# Patient Record
(demographics unavailable — no encounter records)

---

## 2024-10-08 NOTE — PROCEDURE
[___ mm] : [unfilled] mm [___ cm] : [unfilled] cm [___ ml of 1% lidocaine w/ 1:100,000 epinephrine] : [unfilled] ml of 1% lidocaine with 1:100,000 epinephrine [Pressure] : pressure [Electrodessication] : electrodessication [____ x ____ cm] : [unfilled] cm [4-0] : 4-0 Prolene [____ cm] : [unfilled] cm [____ days] : [unfilled] days [FreeTextEntry7] : left periumbilical [TWNoteComboBox1] : Dysplastic Nevus [TWNoteComboBox4] : Dysplastic Nevus [TWNoteComboBox3] : Subcutaneous fat [TWNoteComboBox5] : Subcutaneous fat

## 2024-10-17 NOTE — HISTORY OF PRESENT ILLNESS
[de-identified] : Patient presents for suture removal-  wound healed well, no signs infection sutures removed, dressed; Path:  Margins negative  Continue regular exams;

## 2024-12-30 NOTE — HEALTH RISK ASSESSMENT
[Good] : ~his/her~  mood as  good [Yes] : Yes [2 - 4 times a month (2 pts)] : 2-4 times a month (2 points) [1 or 2 (0 pts)] : 1 or 2 (0 points) [Never (0 pts)] : Never (0 points) [Little interest or pleasure doing things] : 1) Little interest or pleasure doing things [Feeling down, depressed, or hopeless] : 2) Feeling down, depressed, or hopeless [0] : 2) Feeling down, depressed, or hopeless: Not at all (0) [PHQ-2 Negative - No further assessment needed] : PHQ-2 Negative - No further assessment needed [With Significant Other] : lives with significant other [Employed] : employed [College] : College [Significant Other] : lives with significant other [Sexually Active] : sexually active [Never] : Never [0-4] : 0-4 [Audit-CScore] : 2 [[id number]] : 0 [High Risk Behavior] : no high risk behavior [PapSmearComments] : Never performed [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

## 2024-12-30 NOTE — REVIEW OF SYSTEMS
[Muscle Pain] : muscle pain [Anxiety] : anxiety [Fever] : no fever [Chills] : no chills [Pain] : no pain [Vision Problems] : no vision problems [Hearing Loss] : no hearing loss [Sore Throat] : no sore throat [Chest Pain] : no chest pain [Palpitations] : no palpitations [Shortness Of Breath] : no shortness of breath [Cough] : no cough [Abdominal Pain] : no abdominal pain [Nausea] : no nausea [Diarrhea] : diarrhea [Vomiting] : no vomiting [Dysuria] : no dysuria [Itching] : no itching [Skin Rash] : no skin rash [Headache] : no headache [Dizziness] : no dizziness [Negative] : Heme/Lymph [Muscle Weakness] : no muscle weakness [Back Pain] : no back pain [Depression] : no depression

## 2024-12-30 NOTE — HEALTH RISK ASSESSMENT
[Good] : ~his/her~  mood as  good [Yes] : Yes [2 - 4 times a month (2 pts)] : 2-4 times a month (2 points) [1 or 2 (0 pts)] : 1 or 2 (0 points) [Never (0 pts)] : Never (0 points) [Little interest or pleasure doing things] : 1) Little interest or pleasure doing things [Feeling down, depressed, or hopeless] : 2) Feeling down, depressed, or hopeless [0] : 2) Feeling down, depressed, or hopeless: Not at all (0) [PHQ-2 Negative - No further assessment needed] : PHQ-2 Negative - No further assessment needed [With Significant Other] : lives with significant other [Employed] : employed [College] : College [Significant Other] : lives with significant other [Sexually Active] : sexually active [Never] : Never [0-4] : 0-4 [Audit-CScore] : 2 [[id number]] : 0 [High Risk Behavior] : no high risk behavior [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

## 2024-12-30 NOTE — HISTORY OF PRESENT ILLNESS
[FreeTextEntry1] : Establish care, annual physical  [de-identified] : Pt is a 27yo F PMHx thyroid nodules, hashimoto thyroiditis, atypical melanocytic nevus (s/p excision), hx depression/anxiety presenting to establish care. Follows with endocrine for thyroiditis and thyroid nodule. Thyroid nodule was found incidentally on MRI C-spine that was ordered by ortho as pt was having abnormal sensation radiating from neck to top of head causing muscle spasms. She was seen by head and neck surgeon, Dr. Saini, who does not recommend removal at this time. She also follows with endocrinology. Pt is currently euthyroid, had labs in October, not on thyroid meds. She is taking weekly Ergocalciferol. Follows with cardiology (Dr. Roland) for palpitations. Pt had Holter monitor eval, showed sinus tachycardia, normal TTE and normal exercise stress test. Endocrinologist believes her tachycardia was secondary from temporary thyrotoxicosis. Pt also with non-specific myalgias in the R radius as well as the R thigh. Believes all of her symptoms could be have a temporal relation to her menstrual cycle. Pt also concerned about abnormal labs she had with a holistic doctor including MINOR (1:80) and MTHFR gene. Pt also states she is concerned for scoliosis given a protrusion in her ribs, denies any spinal tenderness or nerve pain.

## 2024-12-30 NOTE — HISTORY OF PRESENT ILLNESS
[FreeTextEntry1] : Establish care, annual physical  [de-identified] : Pt is a 27yo F PMHx thyroid nodules, hashimoto thyroiditis, atypical melanocytic nevus (s/p excision), hx depression/anxiety presenting to establish care. Follows with endocrine for thyroiditis and thyroid nodule. Thyroid nodule was found incidentally on MRI C-spine that was ordered by ortho as pt was having abnormal sensation radiating from neck to top of head causing muscle spasms. She was seen by head and neck surgeon, Dr. Saini, who does not recommend removal at this time. She also follows with endocrinology. Pt is currently euthyroid, had labs in October, not on thyroid meds. She is taking weekly Ergocalciferol. Follows with cardiology (Dr. Roland) for palpitations. Pt had Holter monitor eval, showed sinus tachycardia, normal TTE and normal exercise stress test. Endocrinologist believes her tachycardia was secondary from temporary thyrotoxicosis. Pt also with non-specific myalgias in the R radius as well as the R thigh. Believes all of her symptoms could be have a temporal relation to her menstrual cycle. Pt also concerned about abnormal labs she had with a holistic doctor including MINOR (1:80) and MTHFR gene. Pt also states she is concerned for scoliosis given a protrusion in her ribs, denies any spinal tenderness or nerve pain.

## 2024-12-30 NOTE — PLAN
[FreeTextEntry1] :  1. HCM - check blood work, f/u results - referral to GYN - declines Flu vaccine  2. Myalgias, cervical (Neck pain)  - Pt with myalgias and abnormal MSK sensation in C-spine to top of skull - Per pt, MRI without stenosis or impingement - f/u CBC and anemia studies to r/o CALEB as cause of somatic symptoms - referred for trial of physical therapy - consider neurology evaluation   3. Thyroiditis/Thyroid nodules - Currently euthyroid on labs from 10/24 - routine f/u with endocrinology  4. Anxiety/Depression - Previously on Buspar and therapy - Recommend resuming therapy, as could help with increased stress levels leading to somatic symptoms - referred to behavioral health counselor   5. Positive MINOR - Pt without consistent arthralgias, does have myalgias - Explained to pt that MINOR is nonspecific - will recheck blood work, if remains positive, recommend rheumatology evaluation, referral given  6. Scoliosis - Pt with curvature of spine on examination - Recommend ortho f/u, likely will need x-ray for further assessment of venegas angle

## 2024-12-30 NOTE — PHYSICAL EXAM
[Well Nourished] : well nourished [Well Developed] : well developed [Normal Sclera/Conjunctiva] : normal sclera/conjunctiva [PERRL] : pupils equal round and reactive to light [EOMI] : extraocular movements intact [Normal Outer Ear/Nose] : the outer ears and nose were normal in appearance [Normal Oropharynx] : the oropharynx was normal [No Lymphadenopathy] : no lymphadenopathy [No Respiratory Distress] : no respiratory distress  [No Accessory Muscle Use] : no accessory muscle use [Clear to Auscultation] : lungs were clear to auscultation bilaterally [Normal Rate] : normal rate  [Regular Rhythm] : with a regular rhythm [Normal S1, S2] : normal S1 and S2 [No Murmur] : no murmur heard [Soft] : abdomen soft [Non Tender] : non-tender [Non-distended] : non-distended [Normal Bowel Sounds] : normal bowel sounds [No Spinal Tenderness] : no spinal tenderness [Scoliosis] : scoliosis [No Joint Swelling] : no joint swelling [No Focal Deficits] : no focal deficits [Normal Insight/Judgement] : insight and judgment were intact [No Acute Distress] : no acute distress [Well-Appearing] : well-appearing [Normal TMs] : both tympanic membranes were normal [Normal Appearance] : was normal in appearance [Neck Supple] : was supple [No Edema] : there was no peripheral edema [Normal Posterior Cervical Nodes] : no posterior cervical lymphadenopathy [Normal Anterior Cervical Nodes] : no anterior cervical lymphadenopathy [Grossly Normal Strength/Tone] : grossly normal strength/tone [No Rash] : no rash [Alert and Oriented x3] : oriented to person, place, and time [de-identified] : +anxious

## 2024-12-30 NOTE — DATA REVIEWED
[FreeTextEntry1] : Blood work results 10/7/2024:  BUN 12 Creatinine 0.65 eGFR 124  AST 15 ALT 9  TSH 0.99 Free T4 1.2  Prolactin 24 FSH 3.5 LH 9.3  Vitamin D 25-OH 38.8

## 2025-04-22 NOTE — CARDIOLOGY SUMMARY
[de-identified] : 9/12/24 Sinus 83 with rate variation, normal axis, RSR V1-V2, no ST abnormality, QTc 391 4/22/25- Sinus 84, normal axis, no ST changes, QTc 396 [de-identified] : 9/20/2024 EST Normal study , 13.4 METS,  normal BP and HR response [de-identified] : 9/20/2024 TTE LVEF 65-70%, normal diastolic function, normal RV size and function, no significant VHD

## 2025-04-22 NOTE — HISTORY OF PRESENT ILLNESS
[FreeTextEntry1] : 27F h/o thyroid nodule, Hashimoto thyroiditis recent acute onset palpitations 3 days ago and persisted through the next day with HR of 150s went to [facility] on 9/10/24 with HR 120s with also one-episode SBP 160s, borderline D-dimer had CTPA done normal, K level of 3.0 and normal TSH, seen 9/2024 for cardiology evaluation, HM SR with sinus tach HR , no symptom diary for review, rare ectopic beats, TTE  9/20/2024 LVEF 66%, normal study, and underwent normal EST with normal BP/HR response, last seen 10/2024, presents for follow-up.  Reports no recurrence of palpitations but when doing hot exercise classes Pilates like and with cardio-burst noted dizziness at times while standing needs to keep herself in laying down position, exercise HR trend peak of 170s bpm, no actual syncope episode    Prior visit 10/2024:  Pt states 2 days ago Sunday, felt shaky as if she drank excess caffeine, ears were ringing, overall feeling unwell coinciding with her menstrual cycle. denies SOB, CP, near syncope, syncope. Had prolactin, FSH, LH levels , normal.  Also reports had repeat labs for endo, and has appt to f.u with Endocrine next week. She has not experienced recurrence of the symptoms that took her to the ER- no tachycardia / palpitations. follows on apple watch. has not yet returned to HIT exercise   Initial visit 9/2024 Reports she did light yoga exercise that night and recent HIIT exercise did notice exertional limitations, denies chest pain, since wearing her Apple watch with resting HR 80-110s and felt flutter with HR of 130s randomly at rest, she has anxiety in the past and intermittent palpitations but never lasted this long, told with MTHFR gene in the past no prior blood clot, had vasovagal syncope when she was in high school. She had seen prior cardiologist and EP evaluation for short MS about 1-2 years with [facility] does not recollect any testing done but had Echo few years ago ordered by her PCP. Previously on Buspirone for anxiety, had been off OCP since November, pending wedding next June.   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

## 2025-04-22 NOTE — CARDIOLOGY SUMMARY
[de-identified] : 9/12/24 Sinus 83 with rate variation, normal axis, RSR V1-V2, no ST abnormality, QTc 391 4/22/25- Sinus 84, normal axis, no ST changes, QTc 396 [de-identified] : 9/20/2024 EST Normal study , 13.4 METS,  normal BP and HR response [de-identified] : 9/20/2024 TTE LVEF 65-70%, normal diastolic function, normal RV size and function, no significant VHD

## 2025-04-22 NOTE — DISCUSSION/SUMMARY
[FreeTextEntry1] : 27F h/o thyroid nodule, Hashimoto thyroiditis recent acute onset palpitations 3 days ago and persisted through the next day with HR of 150s went to [place] [facility] ER on 9/10/24 with HR 120s with also one-episode SBP 160s, borderline D-dimer had CTPA done normal, K level of 3.0 and normal TSH, seen 9/2024 for cardiology evaluation, HM SR with sinus tach HR , no symptom diary for review, rare ectopic beats, TTE  9/20/2024 LVEF 66%, normal study, and underwent normal EST with normal BP/HR response, last seen 10/2024, presents for follow-up.  Intermittent dizziness during hot exercise classes suspect could be vasovagal triggers, advised to check BP during event and if confirmed low SBP to maintain hydration and electrolytes replacement with use of compression stockings as well, avoid syncope which can lead to fall/injury.   No recurrence of palpitations, continue follow up with Endocrine to monitor for progression of Hashimoto, remains euthyroid on recent lab work per patient.   Follow up as needed if new cardiac issue arise.  [EKG obtained to assist in diagnosis and management of assessed problem(s)] : EKG obtained to assist in diagnosis and management of assessed problem(s)

## 2025-04-22 NOTE — HISTORY OF PRESENT ILLNESS
[FreeTextEntry1] : 27F h/o thyroid nodule, Hashimoto thyroiditis recent acute onset palpitations 3 days ago and persisted through the next day with HR of 150s went to [facility] ER on 9/10/24 with HR 120s with also one-episode SBP 160s, borderline D-dimer had CTPA done normal, K level of 3.0 and normal TSH, seen 9/2024 for cardiology evaluation, HM SR with sinus tach HR , no symptom diary for review, rare ectopic beats, TTE  9/20/2024 LVEF 66%, normal study, and underwent normal EST with normal BP/HR response, last seen 10/2024, presents for follow-up.  Reports no recurrence of palpitations but when doing hot exercise classes Pilates like and with cardio-burst noted dizziness at times while standing needs to keep herself in laying down position, exercise HR trend peak of 170s bpm, no actual syncope episode    Prior visit 10/2024:  Pt states 2 days ago Sunday, felt shaky as if she drank excess caffeine, ears were ringing, overall feeling unwell coinciding with her menstrual cycle. denies SOB, CP, near syncope, syncope. Had prolactin, FSH, LH levels , normal.  Also reports had repeat labs for endo, and has appt to f.u with Endocrine next week. She has not experienced recurrence of the symptoms that took her to the ER- no tachycardia / palpitations. follows on apple watch. has not yet returned to HIT exercise   Initial visit 9/2024 Reports she did light yoga exercise that night and recent HIIT exercise did notice exertional limitations, denies chest pain, since wearing her Apple watch with resting HR 80-110s and felt flutter with HR of 130s randomly at rest, she has anxiety in the past and intermittent palpitations but never lasted this long, told with MTHFR gene in the past no prior blood clot, had vasovagal syncope when she was in high school. She had seen prior cardiologist and EP evaluation for short PA about 1-2 years with [facility] does not recollect any testing done but had Echo few years ago ordered by her PCP. Previously on Buspirone for anxiety, had been off OCP since November, pending wedding next June.   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

## 2025-04-22 NOTE — DISCUSSION/SUMMARY
[FreeTextEntry1] : 27F h/o thyroid nodule, Hashimoto thyroiditis recent acute onset palpitations 3 days ago and persisted through the next day with HR of 150s went to [facility] ER on 9/10/24 with HR 120s with also one-episode SBP 160s, borderline D-dimer had CTPA done normal, K level of 3.0 and normal TSH, seen 9/2024 for cardiology evaluation, HM SR with sinus tach HR , no symptom diary for review, rare ectopic beats, TTE  9/20/2024 LVEF 66%, normal study, and underwent normal EST with normal BP/HR response, last seen 10/2024, presents for follow-up.  Intermittent dizziness during hot exercise classes suspect could be vasovagal triggers, advised to check BP during event and if confirmed low SBP to maintain hydration and electrolytes replacement with use of compression stockings as well, avoid syncope which can lead to fall/injury.   No recurrence of palpitations, continue follow up with Endocrine to monitor for progression of Hashimoto, remains euthyroid on recent lab work per patient.   Follow up as needed if new cardiac issue arise.  [EKG obtained to assist in diagnosis and management of assessed problem(s)] : EKG obtained to assist in diagnosis and management of assessed problem(s)

## 2025-05-22 NOTE — HISTORY OF PRESENT ILLNESS
[de-identified] : The patient has been fit in for urgent appointment.  c/o new lesion on left buttock;  noticed approx 1 month ago, no Sxs

## 2025-05-22 NOTE — PHYSICAL EXAM
[FreeTextEntry3] : Firm, pink, subcutaneous buttonholing papule  non-tender left lateral buttock;   no ulceration, no surface change

## 2025-05-22 NOTE — ASSESSMENT
[FreeTextEntry1] : new lesion left buttock clinically consistent with dermatofibroma no suspicious features   Therapeutic options and their risks and benefits; along with multiple diagnostic possibilities were discussed at length; risks and benefits of further study were discussed;  monitor, check at annual exams (has Hx atypical nevus excision)

## 2025-07-11 NOTE — HEALTH RISK ASSESSMENT
[0] : 2) Feeling down, depressed, or hopeless: Not at all (0) [PHQ-2 Negative - No further assessment needed] : PHQ-2 Negative - No further assessment needed [Never] : Never [[id number]] : 0

## 2025-07-11 NOTE — HISTORY OF PRESENT ILLNESS
[FreeTextEntry8] : 27 year old female presents c/o 12 day h/o pain to her left mid back/flank region. She had gone away for her mini honeymoon after getting  last month. She had gone to [facility]. She had returned home on 6/26. She reports developing mild burning with urination on the day she had returned home and then the next day, noted some blood upon wiping herself after urinating. She went to urgent care, was prescribed Nitrofurantoin for suspected UTI. She received a call on 6/30 that her antibiotics needed to changed- was then prescribed a short course of Ciprofloxacin. She noted that same day that she had developed diffuse pain and achiness to her back. The pain eventually concentrated to her left mid back/flank region. She has felt pain to her left upper abdomen. The pain can worsen with leaning forward. She states her back pain has been constant. She states her left upper abdominal pain is more noticeable when she is eating. No nausea, vomiting, or diarrhea. She denies any burning or pain with urination. No fever or chills. She has been taking Ibuprofen 800 mg along with Tylenol and applying a heating pad w/o much improvement. She reports the back pain was more severe in the beginning and has been gradually improving. She has stopped taking pain medication as it did not help. She did have repeat urinalysis and urine culture done- reports urine culture came back negative.

## 2025-07-11 NOTE — ASSESSMENT
[FreeTextEntry1] : Left sided back, flank pain likely muscular in etiology  trial of Cyclobenzaprine, take as directed, advised on drowsy side effects, not to drive while on medication given recent UTI and risk for pyelonephritis, will check blood work, will refer patient for imaging (check renal ultrasound) will refer patient for x-rays of thoracic spine given left upper abdominal pain- will check blood work, will refer patient for abdominal ultrasound can consider CT abdomen/pelvis if symptoms worsen and/or become severe patient can go to the ER if symptoms become severe

## 2025-07-11 NOTE — REVIEW OF SYSTEMS
[Abdominal Pain] : abdominal pain [Fever] : no fever [Chills] : no chills [Chest Pain] : no chest pain [Shortness Of Breath] : no shortness of breath [Nausea] : no nausea [Diarrhea] : diarrhea [Vomiting] : no vomiting [FreeTextEntry8] : as per HPI  [FreeTextEntry9] : as per HPI

## 2025-07-11 NOTE — PHYSICAL EXAM
[No Acute Distress] : no acute distress [Well Nourished] : well nourished [Well Developed] : well developed [Well-Appearing] : well-appearing [Normal Appearance] : was normal in appearance [Neck Supple] : was supple [No Respiratory Distress] : no respiratory distress  [Clear to Auscultation] : lungs were clear to auscultation bilaterally [Normal Rate] : normal rate  [Regular Rhythm] : with a regular rhythm [Normal S1, S2] : normal S1 and S2 [No Murmur] : no murmur heard [No Edema] : there was no peripheral edema [Soft] : abdomen soft [Non Tender] : non-tender [Normal Bowel Sounds] : normal bowel sounds [No Spinal Tenderness] : no spinal tenderness [No Rash] : no rash [Alert and Oriented x3] : oriented to person, place, and time [de-identified] : mild tenderness to left flank/mid back region